# Patient Record
Sex: MALE | URBAN - METROPOLITAN AREA
[De-identification: names, ages, dates, MRNs, and addresses within clinical notes are randomized per-mention and may not be internally consistent; named-entity substitution may affect disease eponyms.]

---

## 2023-01-27 ENCOUNTER — OFFICE VISIT (OUTPATIENT)
Dept: URBAN - METROPOLITAN AREA CLINIC 63 | Facility: CLINIC | Age: 73
End: 2023-01-27
Payer: COMMERCIAL

## 2023-01-27 ENCOUNTER — DASHBOARD ENCOUNTERS (OUTPATIENT)
Age: 73
End: 2023-01-27

## 2023-01-27 VITALS
OXYGEN SATURATION: 98 % | TEMPERATURE: 98.3 F | HEIGHT: 70 IN | SYSTOLIC BLOOD PRESSURE: 130 MMHG | BODY MASS INDEX: 35.07 KG/M2 | DIASTOLIC BLOOD PRESSURE: 88 MMHG | HEART RATE: 82 BPM | WEIGHT: 245 LBS

## 2023-01-27 DIAGNOSIS — R19.5 POSITIVE COLORECTAL CANCER SCREENING USING COLOGUARD TEST: ICD-10-CM

## 2023-01-27 DIAGNOSIS — I25.759 CORONARY ARTERY DISEASE INVOLVING NATIVE ARTERY OF TRANSPLANTED HEART WITH ANGINA PECTORIS: ICD-10-CM

## 2023-01-27 PROBLEM — 792842004: Status: ACTIVE | Noted: 2023-01-27

## 2023-01-27 PROCEDURE — 99204 OFFICE O/P NEW MOD 45 MIN: CPT | Performed by: NURSE PRACTITIONER

## 2023-01-27 RX ORDER — GLIMEPIRIDE 4 MG/1
TABLET ORAL
Qty: 90 TABLET | Refills: 0 | Status: ACTIVE | COMMUNITY

## 2023-01-27 RX ORDER — METFORMIN HYDROCHLORIDE 1000 MG/1
TABLET ORAL
Qty: 180 TABLET | Refills: 0 | Status: ACTIVE | COMMUNITY

## 2023-01-27 RX ORDER — CLOPIDOGREL BISULFATE 75 MG/1
TABLET, FILM COATED ORAL
Qty: 90 TABLET | Refills: 0 | Status: ACTIVE | COMMUNITY

## 2023-01-27 RX ORDER — INSULIN HUMAN 100 [IU]/ML
INJECTION, SOLUTION PARENTERAL
Qty: 90 MILLILITER | Refills: 0 | Status: ACTIVE | COMMUNITY

## 2023-01-27 RX ORDER — GABAPENTIN 300 MG/1
CAPSULE ORAL
Qty: 270 APPLICATOR | Refills: 0 | Status: ACTIVE | COMMUNITY

## 2023-01-27 RX ORDER — ROSUVASTATIN CALCIUM 5 MG/1
TABLET, FILM COATED ORAL
Qty: 90 TABLET | Refills: 0 | Status: ACTIVE | COMMUNITY

## 2023-01-27 RX ORDER — ONDANSETRON HYDROCHLORIDE 4 MG/1
1 TABLET TABLET, FILM COATED ORAL
Qty: 2 | Refills: 0 | OUTPATIENT
Start: 2023-01-27

## 2023-01-27 NOTE — HPI-HPI
Thank you very much for kindly referring Carlos Perrin, a very pleasant 72-year-old male, to our service due to positive Cologuard.  Past medical history significant for CAD-MI x2 (CABG x3, stent x2, Plavix), HTN, HLD, DM2, CVA (2010, left-sided weakness) and obesity.  Past surgical history significant for CABG x3 (2000), herniorrhaphy and neck procedures.  He states his last complete colonoscopy was at age 51 and denies a personal or family history of colon polyps, colon cancer or other GI malignancy.  Carlos presents today without gastrointestinal complaint and admits to 1-2 unremarkable bowel movements per day, typically in the morning after coughing.  He states his bowel habits are unchanged and he denies any history of blood per rectum or melena.  He denies pyrosis, dysphagia, dyspepsia, abdominal pain, change in bowel habits or unintentional weight loss.

## 2023-02-03 ENCOUNTER — LAB OUTSIDE AN ENCOUNTER (OUTPATIENT)
Dept: URBAN - METROPOLITAN AREA CLINIC 63 | Facility: CLINIC | Age: 73
End: 2023-02-03

## 2024-02-22 NOTE — PHYSICAL EXAM NECK/THYROID:
normal appearance , without tenderness upon palpation , no deformities , trachea midline, no masses , no JVD. done

## 2024-11-22 ENCOUNTER — OFFICE VISIT (OUTPATIENT)
Dept: URBAN - METROPOLITAN AREA CLINIC 63 | Facility: CLINIC | Age: 74
End: 2024-11-22
Payer: COMMERCIAL

## 2024-11-22 VITALS
WEIGHT: 255 LBS | SYSTOLIC BLOOD PRESSURE: 140 MMHG | HEART RATE: 69 BPM | HEIGHT: 70 IN | BODY MASS INDEX: 36.51 KG/M2 | TEMPERATURE: 97 F | DIASTOLIC BLOOD PRESSURE: 90 MMHG | OXYGEN SATURATION: 97 %

## 2024-11-22 DIAGNOSIS — D50.9 IRON DEFICIENCY ANEMIA, UNSPECIFIED IRON DEFICIENCY ANEMIA TYPE: ICD-10-CM

## 2024-11-22 PROBLEM — 87522002: Status: ACTIVE | Noted: 2024-11-22

## 2024-11-22 PROCEDURE — 99214 OFFICE O/P EST MOD 30 MIN: CPT

## 2024-11-22 RX ORDER — METFORMIN HYDROCHLORIDE 1000 MG/1
TABLET ORAL
Qty: 180 TABLET | Refills: 0 | Status: ACTIVE | COMMUNITY

## 2024-11-22 RX ORDER — PANTOPRAZOLE SODIUM 40 MG/1
1 TABLET 1/2 TO 1 HOUR BEFORE MORNING MEAL TABLET, DELAYED RELEASE ORAL ONCE A DAY
Status: ACTIVE | COMMUNITY

## 2024-11-22 RX ORDER — SEMAGLUTIDE 0.68 MG/ML
AS DIRECTED INJECTION, SOLUTION SUBCUTANEOUS
Status: ACTIVE | COMMUNITY

## 2024-11-22 RX ORDER — METOPROLOL TARTRATE 25 MG/1
1 TABLET WITH FOOD TABLET, FILM COATED ORAL TWICE A DAY
Status: ACTIVE | COMMUNITY

## 2024-11-22 RX ORDER — GLIMEPIRIDE 4 MG/1
TABLET ORAL
Qty: 90 TABLET | Refills: 0 | Status: ACTIVE | COMMUNITY

## 2024-11-22 RX ORDER — CLOPIDOGREL BISULFATE 75 MG/1
TABLET, FILM COATED ORAL
Qty: 90 TABLET | Refills: 0 | Status: ACTIVE | COMMUNITY

## 2024-11-22 RX ORDER — ROSUVASTATIN CALCIUM 5 MG/1
TABLET, FILM COATED ORAL
Qty: 90 TABLET | Refills: 0 | Status: ACTIVE | COMMUNITY

## 2024-11-22 RX ORDER — INSULIN HUMAN 100 [IU]/ML
INJECTION, SOLUTION PARENTERAL
Qty: 90 MILLILITER | Refills: 0 | Status: ACTIVE | COMMUNITY

## 2024-11-22 RX ORDER — GABAPENTIN 300 MG/1
CAPSULE ORAL
Qty: 270 APPLICATOR | Refills: 0 | Status: ACTIVE | COMMUNITY

## 2024-11-22 NOTE — HPI-PREVIOUS LABS
Labs dated 11/7/2024 Iron panel - Ferritin 21 (slightly decreased) - Percent saturation 12 (decreased) - TIBC 581 (high) - Iron, total 71 (WNL) . Lipid panel - HDL cholesterol 37 - LDL cholesterol 114 - Non-HDL cholesterol 139 - Rest within normal limits . CMP - Glucose 140 - Creatinine 1.43 - AST 44 - ALT within normal limits - GFR 52 - Rest within normal limits . CBC - Red blood cell count 3.18 - Hemoglobin 10.0 - Hematocrit 31.5 - MCV 99.1 - MCHC 31.7 - RDW 17.0 - Rest within normal limits . TSH - 4.54 (high) - B12 and folate - Within normal limits . Hemoglobin A1c - 8.9 . Positive Cologuard dated 09 January 2023.

## 2024-11-22 NOTE — HPI-HPI
This is a very pleasant 72-year-old male, to our service due to positive Cologuard.  Past medical history significant for CAD-MI x2 (CABG x3, stent x2, on Plavix), HTN, HLD, DM2, CVA (2010, left-sided weakness).  Past surgical history significant for CABG x3 (2000), herniorrhaphy and neck procedures.  He states his last complete colonoscopy was at age 51 and denies a personal or family history of colon polyps, colon cancer or other GI malignancy. Last colonoscopy, age 51, per patient. Patient is endoscopy naive. Cardiologist: Dr. Gaytan, [facility]  Patient was last seen in the office on 1/27/2023 with JR Moffett for a positive Cologuard test.  At that visit, patient explained that his last colonoscopy was at age 51 and denied family history of CRC.  Patient was scheduled for colonoscopy.  He was also referred to [facility] due to a history of CAD/MI and would need cardiac clearance. . Patient presents today explaining that he was taking iron tablets twice daily as well as recently started on Ozempic, however he explains that these 2 medications "bound him up" and he has been having a difficult time having a bowel movement.  He explains that about a week ago he stopped the iron tablets and missed his Ozempic shot in hopes this would allow his bowels to return back to normal.  Patient denies any source of bleeding, including hemoptysis, melena, hematochezia, BRBPR.  Patient explains he does feel fatigued but denies dizziness, lightheadedness, faintness, or a syncopal event.  He denies frequent NSAID use.  I explained to the patient we will order upper and lower endoscopy and I will repeat labs in a few months to assess for improvement or a decline.  Patient is requesting today I give him something to help him with his constipation as he explains he has "tried everything over-the-counter", I gave him a short course of sample of Linzess and told him to discontinue once he begins having regular bowel movements.  Patient is amenable. . Patient denies abdominal pain, belching, bloating, constipation, diarrhea, dysphagia, pyrosis, melena, hematochezia, hematemesis, nausea, vomiting, BRBPR, and unintentional weight loss.

## 2024-11-29 ENCOUNTER — LAB OUTSIDE AN ENCOUNTER (OUTPATIENT)
Dept: URBAN - METROPOLITAN AREA CLINIC 63 | Facility: CLINIC | Age: 74
End: 2024-11-29

## 2025-03-10 ENCOUNTER — TELEPHONE ENCOUNTER (OUTPATIENT)
Dept: URBAN - METROPOLITAN AREA CLINIC 63 | Facility: CLINIC | Age: 75
End: 2025-03-10

## 2025-03-14 ENCOUNTER — TELEPHONE ENCOUNTER (OUTPATIENT)
Dept: URBAN - METROPOLITAN AREA CLINIC 63 | Facility: CLINIC | Age: 75
End: 2025-03-14

## 2025-03-19 ENCOUNTER — CLAIMS CREATED FROM THE CLAIM WINDOW (OUTPATIENT)
Dept: URBAN - METROPOLITAN AREA CLINIC 4 | Facility: CLINIC | Age: 75
End: 2025-03-19
Payer: COMMERCIAL

## 2025-03-19 ENCOUNTER — OFFICE VISIT (OUTPATIENT)
Dept: URBAN - METROPOLITAN AREA SURGERY CENTER 4 | Facility: SURGERY CENTER | Age: 75
End: 2025-03-19
Payer: COMMERCIAL

## 2025-03-19 ENCOUNTER — CLAIMS CREATED FROM THE CLAIM WINDOW (OUTPATIENT)
Dept: URBAN - METROPOLITAN AREA SURGERY CENTER 4 | Facility: SURGERY CENTER | Age: 75
End: 2025-03-19

## 2025-03-19 DIAGNOSIS — K21.9 GASTRO-ESOPHAGEAL REFLUX DISEASE WITHOUT ESOPHAGITIS: ICD-10-CM

## 2025-03-19 DIAGNOSIS — K57.30 DIVERTICULOSIS OF LARGE INTESTINE WITHOUT PERFORATION OR ABSCESS WITHOUT BLEEDING: ICD-10-CM

## 2025-03-19 DIAGNOSIS — K29.70 GASTRITIS WITHOUT BLEEDING, UNSPECIFIED CHRONICITY, UNSPECIFIED GASTRITIS TYPE: ICD-10-CM

## 2025-03-19 DIAGNOSIS — K22.10 ULCER OF ESOPHAGUS WITHOUT BLEEDING: ICD-10-CM

## 2025-03-19 DIAGNOSIS — K63.5 COLON POLYP: ICD-10-CM

## 2025-03-19 DIAGNOSIS — K64.1 SECOND DEGREE HEMORRHOIDS: ICD-10-CM

## 2025-03-19 DIAGNOSIS — K31.89 OTHER DISEASES OF STOMACH AND DUODENUM: ICD-10-CM

## 2025-03-19 DIAGNOSIS — D12.4 ADENOMA OF DESCENDING COLON: ICD-10-CM

## 2025-03-19 DIAGNOSIS — D12.4 BENIGN NEOPLASM OF DESCENDING COLON: ICD-10-CM

## 2025-03-19 PROCEDURE — 45385 COLONOSCOPY W/LESION REMOVAL: CPT | Performed by: INTERNAL MEDICINE

## 2025-03-19 PROCEDURE — 88305 TISSUE EXAM BY PATHOLOGIST: CPT | Performed by: PATHOLOGY

## 2025-03-19 PROCEDURE — 43239 EGD BIOPSY SINGLE/MULTIPLE: CPT | Performed by: INTERNAL MEDICINE

## 2025-03-19 PROCEDURE — 88312 SPECIAL STAINS GROUP 1: CPT | Performed by: PATHOLOGY

## 2025-03-19 PROCEDURE — 00813 ANES UPR LWR GI NDSC PX: CPT | Performed by: NURSE ANESTHETIST, CERTIFIED REGISTERED

## 2025-03-19 RX ORDER — METFORMIN HYDROCHLORIDE 1000 MG/1
TABLET ORAL
Qty: 180 TABLET | Refills: 0 | Status: ACTIVE | COMMUNITY

## 2025-03-19 RX ORDER — SEMAGLUTIDE 0.68 MG/ML
AS DIRECTED INJECTION, SOLUTION SUBCUTANEOUS
Status: ACTIVE | COMMUNITY

## 2025-03-19 RX ORDER — GABAPENTIN 300 MG/1
CAPSULE ORAL
Qty: 270 APPLICATOR | Refills: 0 | Status: ACTIVE | COMMUNITY

## 2025-03-19 RX ORDER — METOPROLOL TARTRATE 25 MG/1
1 TABLET WITH FOOD TABLET, FILM COATED ORAL TWICE A DAY
Status: ACTIVE | COMMUNITY

## 2025-03-19 RX ORDER — ROSUVASTATIN CALCIUM 5 MG/1
TABLET, FILM COATED ORAL
Qty: 90 TABLET | Refills: 0 | Status: ACTIVE | COMMUNITY

## 2025-03-19 RX ORDER — INSULIN HUMAN 100 [IU]/ML
INJECTION, SOLUTION PARENTERAL
Qty: 90 MILLILITER | Refills: 0 | Status: ACTIVE | COMMUNITY

## 2025-03-19 RX ORDER — GLIMEPIRIDE 4 MG/1
TABLET ORAL
Qty: 90 TABLET | Refills: 0 | Status: ACTIVE | COMMUNITY

## 2025-03-19 RX ORDER — CLOPIDOGREL BISULFATE 75 MG/1
TABLET, FILM COATED ORAL
Qty: 90 TABLET | Refills: 0 | Status: ACTIVE | COMMUNITY

## 2025-03-19 RX ORDER — PANTOPRAZOLE SODIUM 40 MG/1
1 TABLET 1/2 TO 1 HOUR BEFORE MORNING MEAL TABLET, DELAYED RELEASE ORAL ONCE A DAY
Status: ACTIVE | COMMUNITY

## 2025-04-02 ENCOUNTER — OFFICE VISIT (OUTPATIENT)
Dept: URBAN - METROPOLITAN AREA CLINIC 63 | Facility: CLINIC | Age: 75
End: 2025-04-02
Payer: COMMERCIAL

## 2025-04-02 ENCOUNTER — LAB OUTSIDE AN ENCOUNTER (OUTPATIENT)
Dept: URBAN - METROPOLITAN AREA CLINIC 63 | Facility: CLINIC | Age: 75
End: 2025-04-02

## 2025-04-02 DIAGNOSIS — D64.9 ABSOLUTE ANEMIA: ICD-10-CM

## 2025-04-02 DIAGNOSIS — K29.01 ACUTE GASTRITIS WITH BLEEDING: ICD-10-CM

## 2025-04-02 DIAGNOSIS — K22.10 ULCER OF ESOPHAGUS WITHOUT BLEEDING: ICD-10-CM

## 2025-04-02 PROBLEM — 30811009: Status: ACTIVE | Noted: 2025-04-02

## 2025-04-02 PROBLEM — 271737000: Status: ACTIVE | Noted: 2025-04-02

## 2025-04-02 PROCEDURE — 99214 OFFICE O/P EST MOD 30 MIN: CPT

## 2025-04-02 RX ORDER — ROSUVASTATIN CALCIUM 5 MG/1
TABLET, FILM COATED ORAL
Qty: 90 TABLET | Refills: 0 | Status: ACTIVE | COMMUNITY

## 2025-04-02 RX ORDER — PANTOPRAZOLE SODIUM 40 MG/1
1 TABLET 1/2 TO 1 HOUR BEFORE MORNING MEAL TABLET, DELAYED RELEASE ORAL ONCE A DAY
Status: ACTIVE | COMMUNITY

## 2025-04-02 RX ORDER — SEMAGLUTIDE 0.68 MG/ML
AS DIRECTED INJECTION, SOLUTION SUBCUTANEOUS
Status: ACTIVE | COMMUNITY

## 2025-04-02 RX ORDER — INSULIN HUMAN 100 [IU]/ML
INJECTION, SOLUTION PARENTERAL
Qty: 90 MILLILITER | Refills: 0 | Status: ACTIVE | COMMUNITY

## 2025-04-02 RX ORDER — PANTOPRAZOLE SODIUM 40 MG/1
1 TABLET 1/2 TO 1 HOUR BEFORE MORNING MEAL TABLET, DELAYED RELEASE ORAL ONCE A DAY
Qty: 180 | Refills: 0 | OUTPATIENT
Start: 2025-04-02

## 2025-04-02 RX ORDER — SUCRALFATE 1 G/1
1 TABLET ON AN EMPTY STOMACH TABLET ORAL TWICE A DAY
Qty: 180 TABLET | Refills: 0 | OUTPATIENT
Start: 2025-04-02

## 2025-04-02 RX ORDER — CLOPIDOGREL BISULFATE 75 MG/1
TABLET, FILM COATED ORAL
Qty: 90 TABLET | Refills: 0 | Status: ACTIVE | COMMUNITY

## 2025-04-02 RX ORDER — METOPROLOL TARTRATE 25 MG/1
1 TABLET WITH FOOD TABLET, FILM COATED ORAL TWICE A DAY
Status: ACTIVE | COMMUNITY

## 2025-04-02 NOTE — HPI-HPI
This is a very pleasant 72-year-old male who presents to the office with the chief complaint of "anemia, POST OP F/U".  Past medical history significant for CAD-MI x2 (CABG x3, stent x2, on Plavix), HTN, HLD, DM2, CVA.  Past surgical history significant for CABG x3 (2000), herniorrhaphy and neck procedures. Family history is significant for maternal uncle with prostate cancer, maternal vaginal cancer, fraternal prostate cancer. Last colonoscopy, 3/19/2025, Dr. Chavez, recall not indicated due to advanced age Last endoscopy 3/19/2025, Dr. Chavez, 3-month recall Cardiologist: Dr. Gaytan, [facility] . Patient was last seen in the office on 11/22/2024 for "anemia, possible GI bleed".   last visit, patient was taking iron tablets twice daily as well as recently being started on Ozempic however he explains that these 2 medications bound him up.  He explained that he discontinued his iron tablets and he missed his Ozempic shot in hopes that this would return his bowel habits are normal.  I explained at last visit that we will order upper and lower endoscopy for assessment of possible GI bleed.  Patient explained that he has tried everything OTC for his constipation and requested I give him something for his constipation.  I gave him a short course of Linzess and told him to discontinue once he begins having regular bowel movements. . Patient presents today explaining he tolerated both procedures well without complications I reviewed patient's colonoscopy and EGD OP report with him in detail and answered all questions.  I explained that and pathology on colonoscopy patient had a 12 mm tubular adenoma removed with a clip to place, and was given no recall due to advanced age.  On EGD I explained that gastritis with adherent blood and erosions were seen as well as an esophageal ulcer with no signs of recent bleeding were also seen.  Dr. Chavez recommended an EGD in 3 months to check for healing, which patient agreed to.  I explained that we will up his pantoprazole to 40mg BID, as well as prescribe sucralfate in a 3 month course.  I explained to the patient that to be thorough we should proceed with a capsule endoscopy to rule out other possible source of bleeding, as patient explains that he is being worked up currently for smoldering myeloma.  He explains that his other providers currently believed that his anemia could be being caused by the precursor myeloma and he will be getting a bone marrow biopsy to assess for this in a few days.  I explained to rule out all GI possibilities, we will proceed with capsule endoscopy as well, and we will continue with a EGD in 3 months.  Patient is agreeable and will follow-up after EGD and capsule endoscopy. . Patient denies abdominal pain, belching, bloating, constipation, diarrhea, dysphagia, pyrosis, melena, hematochezia, hematemesis, nausea, vomiting, BRBPR, and unintentional weight loss.

## 2025-04-02 NOTE — PHYSICAL EXAM EYES:
Conjunctivae and eyelids appear normal,  Sclerae : White without injection From: Rena Gray  To: Tesha Pauslon MD  Sent: 1/16/2017 10:45 AM CST  Subject: Eeg    I made a appointment for next week I did not get my eeg results yet. I have been trying to get ahold of the neurologist.

## 2025-04-02 NOTE — HPI-PREVIOUS LABS
Labs dated 3/21/2025 CMP - Glucose 190 - Creatinine 1.71 - GFR 42 - Rest within normal limits . Labs dated 3/2/2025 CBC - Red blood cell count 3.22 - Hemoglobin 11.1 - Hematocrit 34.0 - .6 - MCH 34.5 - Rest within normal limits . Labs dated 11/7/2024 Iron panel - Ferritin 21 (slightly decreased) - Percent saturation 12 (decreased) - TIBC 581 (high) - Iron, total 71 (WNL) . Lipid panel - HDL cholesterol 37 - LDL cholesterol 114 - Non-HDL cholesterol 139 - Rest within normal limits . CMP - Glucose 140 - Creatinine 1.43 - AST 44 - ALT within normal limits - GFR 52 - Rest within normal limits . CBC - Red blood cell count 3.18 - Hemoglobin 10.0 - Hematocrit 31.5 - MCV 99.1 - MCHC 31.7 - RDW 17.0 - Rest within normal limits . TSH - 4.54 (high) - B12 and folate - Within normal limits . Hemoglobin A1c - 8.9 . Positive Cologuard dated 09 January 2023.

## 2025-04-02 NOTE — HPI-PREVIOUS PROCEDURES
Colonoscopy, 3/19/2025 - Internal hemorrhoids. - Diverticulosis in the left colon. - A 12 mm polyp in the descending colon, resected and retrieved removed piecemeal with cold snare. Clip was placed. - Repeat colonoscopy is not recommended due to current age, per Dr. Chavez. . EGD, 3/19/2025 - Normal duodenal bulb and second portion of the duodenum.  Biopsy. - Gastritis, characterized by adherent blood and erosions.  Biopsy. -  esophageal ulcer with no stigmata of recent bleeding.  Biopsy. - Repeat upper endoscopy in 3 months to check healing, per Dr. Chavez. . Colonoscopy and EGD pathology report, 3/19/2025 - Duodenum, second part biopsy; no significant abnormality. - Stomach, antrum biopsy; no significant abnormality. - Distal esophagus biopsy; squamous mucosa with reflux-type changes, no columnar mucosa identified.  No evidence of Velasquez's esophagus or eosinophilic esophagitis.  Negative for infectious organisms, dysplasia or malignancy. - Descending colon polyp; tubular adenoma. . Last complete colonoscopy, age 51

## 2025-07-17 ENCOUNTER — OFFICE VISIT (OUTPATIENT)
Dept: URBAN - METROPOLITAN AREA SURGERY CENTER 4 | Facility: SURGERY CENTER | Age: 75
End: 2025-07-17

## 2025-07-30 ENCOUNTER — OFFICE VISIT (OUTPATIENT)
Dept: URBAN - METROPOLITAN AREA CLINIC 63 | Facility: CLINIC | Age: 75
End: 2025-07-30